# Patient Record
Sex: FEMALE | URBAN - METROPOLITAN AREA
[De-identification: names, ages, dates, MRNs, and addresses within clinical notes are randomized per-mention and may not be internally consistent; named-entity substitution may affect disease eponyms.]

---

## 2019-09-20 ENCOUNTER — EMERGENCY (EMERGENCY)
Facility: HOSPITAL | Age: 49
LOS: 1 days | Discharge: HOME | End: 2019-09-20
Attending: EMERGENCY MEDICINE | Admitting: EMERGENCY MEDICINE
Payer: MEDICAID

## 2019-09-20 VITALS
HEART RATE: 85 BPM | OXYGEN SATURATION: 100 % | SYSTOLIC BLOOD PRESSURE: 131 MMHG | TEMPERATURE: 98 F | DIASTOLIC BLOOD PRESSURE: 82 MMHG | RESPIRATION RATE: 16 BRPM

## 2019-09-20 PROCEDURE — 99282 EMERGENCY DEPT VISIT SF MDM: CPT

## 2019-09-20 RX ORDER — IBUPROFEN 200 MG
400 TABLET ORAL ONCE
Refills: 0 | Status: COMPLETED | OUTPATIENT
Start: 2019-09-20 | End: 2019-09-20

## 2019-09-20 RX ORDER — LIDOCAINE 4 G/100G
1 CREAM TOPICAL ONCE
Refills: 0 | Status: COMPLETED | OUTPATIENT
Start: 2019-09-20 | End: 2019-09-20

## 2019-09-20 RX ORDER — ACETAMINOPHEN 500 MG
975 TABLET ORAL ONCE
Refills: 0 | Status: COMPLETED | OUTPATIENT
Start: 2019-09-20 | End: 2019-09-20

## 2019-09-20 RX ADMIN — LIDOCAINE 1 PATCH: 4 CREAM TOPICAL at 04:35

## 2019-09-20 RX ADMIN — Medication 975 MILLIGRAM(S): at 04:35

## 2019-09-20 RX ADMIN — Medication 400 MILLIGRAM(S): at 04:35

## 2019-09-20 NOTE — ED PROVIDER NOTE - OBJECTIVE STATEMENT
48 yo F with HTN, HLP that presents with generalized pain x 1 month. Pain includes neck pain, back pain, joint pain in shoulders, knees, low back. No trauma prior to onset but had a fall out of chair 25 years ago and has had pain since then but on/off, no recent trauma, MVAs. Pt was seen at [facility] ED x 2, Sept 2 and sept 10, at first visit was told PNA and given Azithro which she finished and pain meds but not helping, then second visit was told UTI and given Abx which she finished and not having in urinary or URI symptoms at this time. Denies chest pain, SOB, abd pain, N/V/D, urinary symptoms or rash. no travel. Has been taking tylenol without relief and was given Robaxin which made her dizzy so told by PMD to stop. She saw her PMD this morning and had labs taken and referred to orthopedics but hasn't been able to see ortho yet and no results from labs taken today. Denies any headache, vision/hearing changes, no hiking in woods, no bug bites. Non smoker, no drinking, no drugs. No vaginal bleeding or discharge.

## 2019-09-20 NOTE — ED PROVIDER NOTE - CLINICAL SUMMARY MEDICAL DECISION MAKING FREE TEXT BOX
48 yo F with HTN, HLP that presents with generalized pain x 1 month and seen by OSH ED x 2, most recently Sept 10 and saw PMD this morning 9/19 and had labs taken and referred to Ortho but hasn't seen ortho yet that presents with pain. Same pain as last month, no new features. Benign exam. Most likely MSK vs spasms vs a rheumatologic issue. explained that in ED repeating labs or imaging will not help. pt had CXR and chest and Abd CT at OSH 9 days ago which showed only liver hemangioma which they are aware and no other abnormal findings. Labs on OSH reviewed without any gross abnormalities. Pt and family amenable to not obtaining new labs or imaging at this ED visit given last few weeks with three labs taken and imaging taken. Will provide pain meds here, and discharge home to f/u with PMD and Rheum and ortho outpt. If new symptoms develop other than this chronic pain, advised to return to ED for re-evaluation.

## 2019-09-20 NOTE — ED PEDIATRIC NURSE NOTE - CHIEF COMPLAINT QUOTE
Ambulatory to triage arrives with son at bedside translating c/o generalized fatigue/body aches x 1 month. Seen at [facility] twice diagnosed with URI, given ABX. Reports relief of cough, but no relief of aches. Denies fevers at home. PMH HTN

## 2019-09-20 NOTE — ED ADULT TRIAGE NOTE - CHIEF COMPLAINT QUOTE
Ambulatory to triage arrives with son at bedside translating c/o generalized fatigue/body aches x 1 month. Seen at [facility] twice diagnosed with URI, given ABX. Reports relief of cough, but no relief of aches. Denies fevers at home. PMH HTN

## 2019-09-20 NOTE — ED PROVIDER NOTE - NSFOLLOWUPINSTRUCTIONS_ED_ALL_ED_FT
Please follow up with your primary care doctor and ORTHOPEDICS AND RHEUMATOLOGY. after you leave the emergency department so that they can follow up and conduct more testing and treatment as they deem necessary. If you have worsening signs or symptoms of what you came in to the Emergency Department today and are not able to see your doctor, go to your nearest emergency department or return to the [facility] emergency department for further care and management.

## 2019-09-20 NOTE — ED ADULT NURSE NOTE - OBJECTIVE STATEMENT
Pt received in room 3, AA&OX4, pt ambulatory. Pt c/o of generalized body pain including back, neck, joint pain, pain in knees. Pt states she fell out of a chair 25 years ago and has intermittent pain since then. Pt denies any trauma or pain before this episode of pain began. Pt states she went to PCP this morning and lab work was drawn and sent and she was referred to see an orthopedic MD but has not had the chance yet. MD at bedside for evaluation. Pt denies any CP, SOB, headache, fever, chills. Will continue to monitor.

## 2019-09-20 NOTE — ED ADULT NURSE NOTE - CHIEF COMPLAINT QUOTE
Ambulatory to triage arrives with son at bedside translating c/o generalized fatigue/body aches x 1 month. Seen at [facility] twice diagnosed with URI, given ABX. Reports relief of cough, but no relief of aches. Denies fevers at home. PMH HTN

## 2019-09-20 NOTE — ED PEDIATRIC NURSE NOTE - OBJECTIVE STATEMENT
Pt received in room 3, AA&OX4, ambulatory. Pt is primarily Greek speaking, pt requests son at bedside to translate. Pt presents to ED with generalized body pain for one month. Pain is worse in neck, back, joints. Pt reports falling off chair 25 years ago and has pain since then intermittently, pt denies trauma before this episode of pain occurred. Pt saw PCP this morning, labs were drawn and pt was referred to see orthopedics but has not seen one yet. MD at bedside for evaluation. Will continue to monitor.

## 2019-09-20 NOTE — ED PROVIDER NOTE - PATIENT PORTAL LINK FT
You can access the FollowMyHealth Patient Portal offered by [facility] by registering at the following website: [email or web address]. By joining [email or web address]’s FollowMyHealth portal, you will also be able to view your health information using other applications (apps) compatible with our system.

## 2019-09-20 NOTE — ED PROVIDER NOTE - NSFOLLOWUPCLINICS_GEN_ALL_ED_FT
[facility]  Rheumatology  [address]  [place] [address]  Phone: [phone]  Fax:   Follow Up Time: Routine    [facility]  Rheumatology  [address] [place] [address]  Phone: [phone]  Fax: [phone]  Follow Up Time: Routine

## 2019-10-01 ENCOUNTER — OUTPATIENT (OUTPATIENT)
Dept: OUTPATIENT SERVICES | Facility: HOSPITAL | Age: 49
LOS: 1 days | End: 2019-10-01
Payer: MEDICAID

## 2019-10-01 PROCEDURE — G9001: CPT

## 2019-10-14 DIAGNOSIS — Z71.89 OTHER SPECIFIED COUNSELING: ICD-10-CM

## 2019-10-14 PROBLEM — I10 ESSENTIAL (PRIMARY) HYPERTENSION: Chronic | Status: ACTIVE | Noted: 2019-09-20

## 2019-10-14 PROBLEM — E78.5 HYPERLIPIDEMIA, UNSPECIFIED: Chronic | Status: ACTIVE | Noted: 2019-09-20

## 2021-07-29 NOTE — ED PROVIDER NOTE - CONDITION AT DISCHARGE:
Received report, pt getting in gown at this time. More fatigue lately, low blood counts per pcp per pt. To evaluate pt after assisting provider. Improved

## 2021-12-22 NOTE — ED ADULT NURSE NOTE - NSIMPLEMENTINTERV_GEN_ALL_ED
piv inserted with US   Implemented All Universal Safety Interventions:  [place] to call system. Call bell, personal items and telephone within reach. Instruct patient to call for assistance. Room bathroom lighting operational. Non-slip footwear when patient is off stretcher. Physically safe environment: no spills, clutter or unnecessary equipment. Stretcher in lowest position, wheels locked, appropriate side rails in place.

## 2024-08-15 NOTE — ED ADULT NURSE NOTE - CAS ELECT INFOMATION PROVIDED
Refill Routing Note   Medication(s) are not appropriate for processing by [facility] for the following reason(s):        Non-participating provider    [facility] action(s):  Route             Appointments  past 12m or future 3m with PCP    Date Provider   Last Visit   Visit date not found Angel Pemberton MD   Next Visit   Visit date not found Angel Pemberton MD   ED visits in past 90 days: 0        Note composed:3:23 PM 08/15/2024                DC instructions